# Patient Record
Sex: FEMALE | Race: WHITE | NOT HISPANIC OR LATINO | Employment: PART TIME | URBAN - METROPOLITAN AREA
[De-identification: names, ages, dates, MRNs, and addresses within clinical notes are randomized per-mention and may not be internally consistent; named-entity substitution may affect disease eponyms.]

---

## 2023-12-14 ENCOUNTER — ANCILLARY PROCEDURE (OUTPATIENT)
Dept: RADIOLOGY | Facility: CLINIC | Age: 23
End: 2023-12-14
Payer: COMMERCIAL

## 2023-12-14 ENCOUNTER — OFFICE VISIT (OUTPATIENT)
Dept: URGENT CARE | Facility: CLINIC | Age: 23
End: 2023-12-14
Payer: COMMERCIAL

## 2023-12-14 VITALS
OXYGEN SATURATION: 98 % | RESPIRATION RATE: 14 BRPM | DIASTOLIC BLOOD PRESSURE: 80 MMHG | HEART RATE: 89 BPM | TEMPERATURE: 98.3 F | SYSTOLIC BLOOD PRESSURE: 116 MMHG
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
## 2023-12-14 DIAGNOSIS — S51.851A DOG BITE OF RIGHT FOREARM, INITIAL ENCOUNTER: Primary | ICD-10-CM

## 2023-12-14 DIAGNOSIS — S51.851A DOG BITE OF RIGHT FOREARM, INITIAL ENCOUNTER: ICD-10-CM
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
## 2023-12-14 PROCEDURE — 73090 X-RAY EXAM OF FOREARM: CPT | Mod: RT

## 2023-12-14 PROCEDURE — 99212 OFFICE O/P EST SF 10 MIN: CPT | Mod: 25 | Performed by: PHYSICIAN ASSISTANT

## 2023-12-14 PROCEDURE — 73090 X-RAY EXAM OF FOREARM: CPT | Mod: RIGHT SIDE | Performed by: RADIOLOGY

## 2023-12-14 RX ORDER — ATENOLOL 25 MG/1
25 TABLET ORAL
COMMUNITY
Start: 2023-06-28

## 2023-12-14 RX ORDER — RIZATRIPTAN BENZOATE 10 MG/1
10 TABLET ORAL
COMMUNITY
Start: 2022-08-26

## 2023-12-14 RX ORDER — SULFAMETHOXAZOLE AND TRIMETHOPRIM 800; 160 MG/1; MG/1
1 TABLET ORAL 2 TIMES DAILY
Qty: 6 TABLET | Refills: 0 | Status: SHIPPED | OUTPATIENT
Start: 2023-12-14 | End: 2023-12-17

## 2023-12-14 RX ORDER — TRIAMCINOLONE ACETONIDE 1 MG/G
CREAM TOPICAL
COMMUNITY
Start: 2020-12-04

## 2023-12-14 RX ORDER — MONTELUKAST SODIUM 10 MG/1
10 TABLET ORAL
COMMUNITY
Start: 2021-02-08

## 2023-12-14 RX ORDER — DICYCLOMINE HYDROCHLORIDE 20 MG/1
10 TABLET ORAL 4 TIMES DAILY PRN
COMMUNITY
Start: 2022-09-08

## 2023-12-14 RX ORDER — ALBUTEROL SULFATE 90 UG/1
2 AEROSOL, METERED RESPIRATORY (INHALATION) EVERY 6 HOURS PRN
COMMUNITY
Start: 2018-05-29

## 2023-12-14 RX ORDER — FAMOTIDINE 20 MG/1
20 TABLET, FILM COATED ORAL
COMMUNITY
Start: 2018-06-07

## 2023-12-14 RX ORDER — ONDANSETRON 4 MG/1
4 TABLET, ORALLY DISINTEGRATING ORAL
COMMUNITY
Start: 2022-08-26

## 2023-12-14 RX ORDER — OMEPRAZOLE 20 MG/1
20 CAPSULE, DELAYED RELEASE ORAL
COMMUNITY

## 2023-12-14 RX ORDER — SUMATRIPTAN SUCCINATE 4 MG/.5ML
INJECTION, SOLUTION SUBCUTANEOUS
COMMUNITY
Start: 2022-08-26

## 2023-12-14 RX ORDER — ETONOGESTREL AND ETHINYL ESTRADIOL VAGINAL RING .015; .12 MG/D; MG/D
RING VAGINAL
COMMUNITY

## 2023-12-14 RX ORDER — MUPIROCIN 20 MG/G
1 OINTMENT TOPICAL
Qty: 3 G | Refills: 0 | Status: SHIPPED | OUTPATIENT
Start: 2023-12-14 | End: 2023-12-21

## 2023-12-14 RX ORDER — EPINEPHRINE 0.3 MG/.3ML
INJECTION SUBCUTANEOUS
COMMUNITY
Start: 2023-12-03

## 2023-12-14 RX ORDER — LANOLIN ALCOHOL/MO/W.PET/CERES
400 CREAM (GRAM) TOPICAL NIGHTLY
COMMUNITY
Start: 2023-11-18

## 2023-12-14 RX ORDER — KETOROLAC TROMETHAMINE 10 MG/1
10 TABLET, FILM COATED ORAL
COMMUNITY
Start: 2023-06-21

## 2023-12-14 RX ORDER — RIMEGEPANT SULFATE 75 MG/75MG
75 TABLET, ORALLY DISINTEGRATING ORAL
COMMUNITY
Start: 2022-01-26

## 2023-12-14 RX ORDER — LEVOTHYROXINE SODIUM 88 UG/1
88 TABLET ORAL DAILY
COMMUNITY
Start: 2021-05-17

## 2023-12-14 NOTE — PROGRESS NOTES
[facility] URGENT CARE ANNMARIE NOTE:      Name: Simi Jerome, 23 y.o.    CSN:[id number]   MRN:[id number]    PCP: Michaela Li, APRN-CNP    ALL:    Allergies   Allergen Reactions    Methylphenidate Hives    Tadalafil Hives     Other reaction(s): AOF       History:    Chief Complaint: Animal Bite (R FOREARM  X LAST NIGHT)    Encounter Date: 12/14/2023  14:00hrs    HPI: The history was obtained from the patient. Simi is a 23 y.o. female, who presents with a chief complaint of Animal Bite (R FOREARM  X LAST NIGHT)   Mentions the dog was defending a territory against another family pet, the [place]rie and jumped biting her right forearm during the [place], she has some swelling, ecchymosis and a few abrasions without significant puncture wounds or lacerations to the right proximal volar or and dorsal forearm.    PMHx:    No past medical history on file.           Current Outpatient Medications   Medication Sig Dispense Refill    albuterol 90 mcg/actuation inhaler Inhale 2 puffs every 6 hours if needed.      atenolol (Tenormin) 25 mg tablet Take 1 tablet (25 mg) by mouth once daily.      dicyclomine (Bentyl) 20 mg tablet Take 0.5 tablets (10 mg) by mouth 4 times a day as needed.      EPINEPHrine 0.3 mg/0.3 mL injection syringe Inject 0.3 mL (0.3 mg total) into the mid-thigh as needed for severe allergic reaction. .      etonogestreL-ethinyl estradioL (Nuvaring) 0.12-0.015 mg/24 hr vaginal ring Insert into the vagina.      famotidine (Pepcid) 20 mg tablet Take 1 tablet (20 mg) by mouth once daily.      ketorolac (Toradol) 10 mg tablet Take 1 tablet (10 mg) by mouth.      levothyroxine (Synthroid, Levoxyl) 88 mcg tablet Take 1 tablet (88 mcg) by mouth once daily.      magnesium oxide (Mag-Ox) 400 mg (241.3 mg magnesium) tablet Take 1 tablet (400 mg) by mouth once daily at bedtime.      montelukast (Singulair) 10 mg tablet Take 1 tablet (10 mg) by mouth once daily.      Nurtec ODT 75 mg  tablet,disintegrating 1 tablet (75 mg).      omeprazole (PriLOSEC) 20 mg DR capsule Take 1 capsule (20 mg) by mouth once daily.      ondansetron ODT (Zofran-ODT) 4 mg disintegrating tablet Take 1 tablet (4 mg) by mouth.      rizatriptan (Maxalt) 10 mg tablet 1 tablet (10 mg).      SUMAtriptan succinate 4 mg/0.5 mL pen injector 1 sub q dose prn headache, may repeat in 2 hours; no more than 2 a day      triamcinolone (Kenalog) 0.1 % cream Apply thin layer topically to affected area twice a day       No current facility-administered medications for this visit.         PMSx:  No past surgical history on file.    Fam Hx: No family history on file.    SOC. Hx:     Social History     Socioeconomic History    Marital status: Single     Spouse name: Not on file    Number of children: Not on file    Years of education: Not on file    Highest education level: Not on file   Occupational History    Not on file   Tobacco Use    Smoking status: Not on file    Smokeless tobacco: Not on file   Substance and Sexual Activity    Alcohol use: Not on file    Drug use: Not on file    Sexual activity: Not on file   Other Topics Concern    Not on file   Social History Narrative    Not on file     Social Determinants of Health     Financial Resource Strain: Not on file   Food Insecurity: Not on file   Transportation Needs: Not on file   Physical Activity: Not on file   Stress: Not on file   Social Connections: Not on file   Intimate Partner Violence: Not on file   Housing Stability: Not on file         Vitals:    12/14/23 1330   BP: 116/80   Pulse: 89   Resp: 14   Temp: 36.8 °C (98.3 °F)   SpO2: 98%                Physical Exam  Constitutional:       Appearance: Normal appearance. She is obese.   HENT:      Head: Normocephalic and atraumatic.   Eyes:      Extraocular Movements: Extraocular movements intact.      Pupils: Pupils are equal, round, and reactive to light.   Cardiovascular:      Rate and Rhythm: Normal rate.   Pulmonary:       Effort: Pulmonary effort is normal.   Musculoskeletal:         General: Swelling, tenderness (Limited range of motion with pronation supination) and signs of injury (Right proximal forearm noted ecchymosis, mild abrasions and some swelling) present.      Cervical back: Normal range of motion.   Skin:     General: Skin is warm.   Neurological:      General: No focal deficit present.      Mental Status: She is alert and oriented to person, place, and time.      Sensory: No sensory deficit.      Motor: No weakness.           LABORATORY @ RADIOLOGICAL IMAGING (if done):     FINDINGS:  No significant osteophytic change.  No lytic or blastic destructive bone lesion.  There is mild soft tissue edema in the proximal, ventral, radial  aspect of the forearm. No soft tissue gas density. No acute fracture  or dislocation. No opaque soft tissue foreign body.  No periosteal reaction or erosion.      IMPRESSION:  Mild proximal forearm soft tissue swelling as described.      Otherwise, negative exam.      Signed by: Javon Castaneda 12/14/2023 2:23 PM  Dictation workstation:   [id number]     COURSE/MEDICAL DECISION MAKING:    Simi is a 23 y.o., who presents with a working diagnosis of   1. Dog bite of right forearm, initial encounter     with a differential to include: Abrasion, contusion, foreign body retained, laceration, phlebitis    Wound was cleansed in the office, discussed treatment plan at bedside to include use of Bactrim to cover for staph, Bactroban, encourage she apply ice and use anti-inflammatory such as ibuprofen and Tylenol for pain.  An Ace wrap was applied to the right forearm she was discharged.        Raymond Poon PA-C   Advanced Practice Provider  [facility] URGENT CARE